# Patient Record
Sex: MALE | Race: WHITE | NOT HISPANIC OR LATINO | Employment: UNEMPLOYED | URBAN - METROPOLITAN AREA
[De-identification: names, ages, dates, MRNs, and addresses within clinical notes are randomized per-mention and may not be internally consistent; named-entity substitution may affect disease eponyms.]

---

## 2023-12-19 ENCOUNTER — APPOINTMENT (OUTPATIENT)
Dept: BEHAVIORAL HEALTH | Facility: CLINIC | Age: 23
End: 2023-12-19
Payer: COMMERCIAL

## 2024-01-04 ENCOUNTER — TELEMEDICINE (OUTPATIENT)
Dept: BEHAVIORAL HEALTH | Facility: CLINIC | Age: 24
End: 2024-01-04
Payer: COMMERCIAL

## 2024-01-04 DIAGNOSIS — F41.1 GENERALIZED ANXIETY DISORDER: Primary | ICD-10-CM

## 2024-01-04 PROCEDURE — 99213 OFFICE O/P EST LOW 20 MIN: CPT | Performed by: PSYCHIATRY & NEUROLOGY

## 2024-01-04 RX ORDER — MIRTAZAPINE 45 MG/1
45 TABLET, FILM COATED ORAL NIGHTLY
Qty: 90 TABLET | Refills: 1 | Status: SHIPPED | OUTPATIENT
Start: 2024-01-04 | End: 2024-05-13

## 2024-01-05 NOTE — PROGRESS NOTES
Virtual appointment with patient.   Consent obtained for this platform and identification verified.  He was located at home in [place].     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       He has been adherent to Duloxetine 60 mg every morning.  When Mirtazapine 30 mg refill was delayed from pharmacy he utilized remaining 45 mg tablet she had at home and found that 45 mg worked better for sleep and for anxiety.   He states the combination of Duloxetine 60 mg every morning and Mirtazapine 45 mg every evening effective anxiety regimen and well-tolerated.       He denies any sustained sadness.  He denies suicidal ideation.  No aggression or self-harm.      No flaco or hallucinations.   No substance use.       MSE:  Normal dress and grooming.   Thought process goal-directed.  Euthymic mood.  Full range of affect. Speech normal tone, rate, quality.   Future-oriented.   No suicidal or homicidal ideation.   Alert and oriented X 4.   Judgment and insight good    Dx:  Generalized Anxiety Disorder    Plan:    Continue Duloxetine 60 mg every morning.   Ongoing consent obtained.  Has sufficient supply at home.      Continue Mirtazapine 45 mg every evening.  Ongoing consent obtained.  Rx for 45 mg #90 with one refill sent to [facility]    Therapy ongoing    Follow-up in 3 months, call as needed in the interim

## 2024-04-04 DIAGNOSIS — F41.1 GENERALIZED ANXIETY DISORDER: ICD-10-CM

## 2024-04-04 RX ORDER — DULOXETIN HYDROCHLORIDE 60 MG/1
60 CAPSULE, DELAYED RELEASE ORAL DAILY
Qty: 90 CAPSULE | Refills: 1 | Status: SHIPPED | OUTPATIENT
Start: 2024-04-04

## 2024-05-11 DIAGNOSIS — F41.1 GENERALIZED ANXIETY DISORDER: ICD-10-CM

## 2024-05-13 RX ORDER — MIRTAZAPINE 45 MG/1
45 TABLET, FILM COATED ORAL NIGHTLY
Qty: 90 TABLET | Refills: 0 | Status: SHIPPED | OUTPATIENT
Start: 2024-05-13 | End: 2024-11-09

## 2024-06-28 ENCOUNTER — APPOINTMENT (OUTPATIENT)
Dept: BEHAVIORAL HEALTH | Facility: CLINIC | Age: 24
End: 2024-06-28
Payer: COMMERCIAL

## 2024-06-28 DIAGNOSIS — F41.1 GENERALIZED ANXIETY DISORDER: Primary | ICD-10-CM

## 2024-06-28 PROCEDURE — 99213 OFFICE O/P EST LOW 20 MIN: CPT | Performed by: PSYCHIATRY & NEUROLOGY

## 2024-06-28 RX ORDER — HYDROXYZINE PAMOATE 25 MG/1
CAPSULE ORAL
Qty: 90 CAPSULE | Refills: 0 | Status: SHIPPED | OUTPATIENT
Start: 2024-06-28

## 2024-06-29 NOTE — PROGRESS NOTES
Virtual appointment with patient.  Consent obtained for this platform and identification verified.   He was located at home.      He reports doing well and in good mood.      Adherent to medication regimen of Duloxetine 60 mg every morning and Mirtazapine 45 mg every evening.   Denies adverse effects and reports continuing to help with mood and anxiety.      He still has periodic heightened states of anxiety.      No flaco or hallucinations.    No substance use.      He meditates every morning to clear mind to start the day.    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      MSE:  Normal dress and grooming.  Thought process goal-directed.  Euthymic mood, full range of affect.  Speech normal tone, rate, quality.  Denies suicidal or homicidal ideation.  Alert and oriented X 4.  Judgment and insight good.      Dx:  Generalized Anxiety Disorder    Plan:    Continue Duloxetine 60 mg every morning,  Ongoing consent obtained.  Has sufficient supply.      Continue Mirtazapine 45 mg every evening.  Ongoing consent obtained.  Has sufficient supply.      Hydroxyzine 25 mg PRN heightened anxiety.  Consent obtained after review of risks and benefits.  Rx for 25 mg #90 sent to [facility]    Follow-up 12/2024, call as needed in the interim

## 2024-08-12 DIAGNOSIS — F41.1 GENERALIZED ANXIETY DISORDER: ICD-10-CM

## 2024-08-12 RX ORDER — HYDROXYZINE PAMOATE 25 MG/1
CAPSULE ORAL
Qty: 90 CAPSULE | Refills: 0 | OUTPATIENT
Start: 2024-08-12

## 2024-08-13 NOTE — TELEPHONE ENCOUNTER
Patient wanted to let you know that he has been stressed out and not feeling like himself lately. Wanted to talk about possibly changing his medication. Would like to speak with  you sooner than next scheduled appointment if possible for you to give him a call [phone]

## 2024-08-15 ENCOUNTER — CLINICAL SUPPORT (OUTPATIENT)
Dept: AUDIOLOGY | Facility: CLINIC | Age: 24
End: 2024-08-15
Payer: COMMERCIAL

## 2024-08-15 DIAGNOSIS — H93.13 TINNITUS AURIUM, BILATERAL: ICD-10-CM

## 2024-08-15 DIAGNOSIS — R42 DIZZINESS: ICD-10-CM

## 2024-08-15 DIAGNOSIS — H90.42 SENSORINEURAL HEARING LOSS (SNHL) OF LEFT EAR WITH UNRESTRICTED HEARING OF RIGHT EAR: Primary | ICD-10-CM

## 2024-08-15 PROCEDURE — 92550 TYMPANOMETRY & REFLEX THRESH: CPT

## 2024-08-15 PROCEDURE — 92557 COMPREHENSIVE HEARING TEST: CPT

## 2024-08-15 NOTE — PROGRESS NOTES
AUDIOLOGY ADULT AUDIOMETRIC EVALUATION      Name:  Saud Hu   :  2000  Age:  23 y.o.  Date of Evaluation:  8/15/2024    Time: 6491-8754    IMPRESSIONS     Normal hearing sensitivity in the right ear and moderate to mild sensorineural hearing loss from 125-1500 Hz with normal hearing sensitivity at 2000 Hz sloping to severe sensorineural hearing loss from 7084-1430 Hz in the left ear.  Word understanding in quiet is excellent in both ears.  Tympanometry indicates normal middle ear pressure and tympanic membrane mobility in both ears.     Amplification needs: Continue full-time use of left hearing aid, except when activities preclude device safety.    RECOMMENDATIONS     Continue medical follow up with primary care provider and/or Ears Nose and Throat (ENT) provider as recommended.  Return for audiologic evaluation annually to monitor hearing sensitivity and assess middle ear status or sooner should concerns arise. The audiology department can be reached at [phone] to schedule an appointment.   Strive for full-time device use during waking hours, except when activities preclude device safety.  Avoid exposure to loud sounds by moving away from the noise, turning down the volume, or wearing proper hearing protection correctly.    HISTORY     Reason for visit:  Mr. Hu is seen today for a repeat audiologic evaluation due to known hearing loss in the left ear. Previous audio was performed on 22 and revealed normal hearing sensitivity for the right ear, and moderate to mild sensorineural hearing loss 250 - 1500 Hz with normal response at 2000 Hz falling to mild sensorineural hearing loss 3000- 8000 Hz for the left ear. History obtained from patient report and chart review.     Change in Hearing: denied  Tinnitus: yes in both ears non-bothersome  Otalgia: denied  Aural Pressure/Fullness: denied  Recent Ear Infections/Illness: denied  Otorrhea: denied  Dizziness: yes, described as off balance  feeling      HEARING AID INFORMATION    Left   Date of Fitting 4/25/22   [facility]    [place]   Model Audeo P50-R   Serial Number [id number]    Warranty (Repair & L/D)  07/03/2025   Wax Protection Cerushield   /Dome 1M/small opem       EVALUATION         TEST RESULTS     Otoscopic Evaluation:  Right Ear: Ear canal clear, tympanic membrane visualized.  Left Ear: Ear canal clear, tympanic membrane visualized.    Tympanometry (226 Hz):  Right Ear: Type A, middle ear pressure and tympanic membrane compliance within normal limits.   Left Ear: Type A, middle ear pressure and tympanic membrane compliance within normal limits.     Acoustic Reflexes:   Right Ear: (Ipsilateral) Responses present at expected levels 500-4000 Hz.  Left Ear: (Ipsilateral) Responses present at 500-2000 Hz, and absent at 4000 Hz.    Test technique:  Pure Tone Audiometry via headphones  Reliability: Good    Pure Tone Audiometry:    Right Ear: Normal hearing sensitivity 125-8000 Hz  Left Ear: Moderate to mild sensorineural hearing loss from 125-1500 Hz with normal hearing sensitivity at 2000 Hz sloping to severe sensorineural hearing loss from 8347-2918 Hz    Speech Audiometry:   Right Ear:  Speech Reception Threshold (SRT) was obtained at 5 dB HL. Word Recognition scores were excellent (96%) in quiet when words were presented at 50 dB HL. These results are based on [facility] Auditory Test No.6 (NU-6) (N=25).   Left Ear:  Speech Reception Threshold (SRT) was obtained at 30 dB HL. Word Recognition scores were excellent (96%) in quiet when words were presented at 70 dB HL. These results are based on [facility] Auditory Test No.6 (NU-6) (N=25).     Comparison of today's results with previous test results: Slight progression in left ear since last evaluation on 1/14/22     Hearing aid programming:  Hearing aid connected to software and firmware update was completed. Hearing aid reprogrammed to today's  hearing test. Patient noted an echo sound. Ran audiogram direct and feedback manager. Patient reported improved sound volume and sound quality.     Hearing aid cleaned and checked. Changed dome and wax guard. Provided patient with extra domes and wax guards. Patient pleased with changes.     PATIENT EDUCATION     Discussed results and recommendations with Mr. Hu. Questions were addressed and the patient was encouraged to contact our department at [phone] should concerns arise.     JOSH Gamino.  Audiology Graduate Clinician    [facility]  Licensed Clinical Audiologist  I verify that I have reviewed the history, test results, and interpretation for this patient.    Degree of   Hearing Sensitivity dB Range   Within Normal Limits (WNL) 0 - 20   Slight 25   Mild 26 - 40   Moderate 41 - 55   Moderately-Severe 56 - 70   Severe 71 - 90   Profound 91 +     Key   CHL Conductive Hearing Loss   ECV Ear Canal Volume   HA Hearing Aid   NIHL Noise-Induced Hearing Loss   PTA Pure Tone Average   SNHL Sensorineural Hearing Loss   TM Tympanic Membrane   WNL Within Normal Limits

## 2024-09-06 ENCOUNTER — APPOINTMENT (OUTPATIENT)
Dept: BEHAVIORAL HEALTH | Facility: CLINIC | Age: 24
End: 2024-09-06
Payer: COMMERCIAL

## 2024-09-12 ENCOUNTER — APPOINTMENT (OUTPATIENT)
Dept: BEHAVIORAL HEALTH | Facility: CLINIC | Age: 24
End: 2024-09-12
Payer: COMMERCIAL

## 2024-09-12 DIAGNOSIS — F32.1 MAJOR DEPRESSIVE DISORDER, SINGLE EPISODE, MODERATE (MULTI): ICD-10-CM

## 2024-09-12 DIAGNOSIS — F41.1 GENERALIZED ANXIETY DISORDER: Primary | ICD-10-CM

## 2024-09-12 PROCEDURE — 99213 OFFICE O/P EST LOW 20 MIN: CPT | Performed by: PSYCHIATRY & NEUROLOGY

## 2024-09-12 RX ORDER — MIRTAZAPINE 45 MG/1
45 TABLET, FILM COATED ORAL NIGHTLY
Qty: 90 TABLET | Refills: 1 | Status: SHIPPED | OUTPATIENT
Start: 2024-09-12 | End: 2025-03-11

## 2024-09-12 RX ORDER — FLUOXETINE HYDROCHLORIDE 20 MG/1
20 CAPSULE ORAL DAILY
Qty: 90 CAPSULE | Refills: 1 | Status: SHIPPED | OUTPATIENT
Start: 2024-09-12 | End: 2025-03-11

## 2024-09-12 RX ORDER — HYDROXYZINE PAMOATE 25 MG/1
CAPSULE ORAL
Qty: 180 CAPSULE | Refills: 0 | Status: SHIPPED | OUTPATIENT
Start: 2024-09-12

## 2024-09-15 NOTE — PROGRESS NOTES
"Virtual appointment with patient.  Consent obtained for this platform and identification verified.   He was located at home.      He reports feeling more depressed over the last 2 months which includes \"passive\" suicidal ideation.  Because of his concern that being on Duloxetine was a factor with his suicidal thoughts he lowered the dose to 30 mg a month ago.   He indicates suicidal thoughts have continued at similar frequency ie more days than not.  He denies suicidal plan or intent.   He denies self-harm.      Also accompanying depression energy level lower, some amotivation and anhedonia as well as more difficulty getting restful night of sleep.    Appetite fair.      In addition to taking Duloxetine 60 mg every morning he has been taking Mirtazapine 45 mg every evening and Hydroxyzine 25 mg PRN for heightened anxiety.    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       He has been meeting with his  and also recently established with [facility] counselor after seeing therapist at [facility] for temporary period of time.        MSE:  Normal dress and grooming.   Thought process goal-directed.   Depressed mood, full range of affect.  Passive suicidal ideation, denies plan or intent.   Future-oriented.   No agitation.   Alert and oriented X 4.   Judgment and insight fair    Dx:  Generalized Anxiety Disorder  Major Depressive Disorder, single episode, moderate    Plan:    Discontinue Duloxetine    Begin Fluoxetine 20 mg every morning.   Consent obtained after review of risks and benefits including boxed warning and serotonin syndrome.  Rx for 20 mg #90 with one refill sent to [facility]    Continue Mirtazapine 45 mg every evening.  Ongoing consent obtained.      Continue Hydroxyzine 25 mg as needed for heightened anxiety.      Therapy ongoing    Reviewed use of ER if acute safety " matters.     Work commitments make an IOP or PHP difficult at this time    Follow-up 4-6 weeks, call as needed in the interim

## 2024-10-15 ENCOUNTER — APPOINTMENT (OUTPATIENT)
Dept: BEHAVIORAL HEALTH | Facility: CLINIC | Age: 24
End: 2024-10-15
Payer: COMMERCIAL

## 2024-10-24 ENCOUNTER — APPOINTMENT (OUTPATIENT)
Dept: BEHAVIORAL HEALTH | Facility: CLINIC | Age: 24
End: 2024-10-24
Payer: COMMERCIAL

## 2025-01-02 ENCOUNTER — APPOINTMENT (OUTPATIENT)
Dept: BEHAVIORAL HEALTH | Facility: CLINIC | Age: 25
End: 2025-01-02
Payer: COMMERCIAL

## 2025-01-02 DIAGNOSIS — F41.1 GENERALIZED ANXIETY DISORDER: Primary | ICD-10-CM

## 2025-01-02 DIAGNOSIS — F32.1 MAJOR DEPRESSIVE DISORDER, SINGLE EPISODE, MODERATE (MULTI): ICD-10-CM

## 2025-01-02 PROCEDURE — 99213 OFFICE O/P EST LOW 20 MIN: CPT | Performed by: PSYCHIATRY & NEUROLOGY

## 2025-01-03 NOTE — PROGRESS NOTES
"Virtual appointment with patient.  Consent obtained for this platform and identification verified.  He was located at home.      He states change from Duloxetine to Fluoxetine 20 mg at last appointment 9/2024 has helped with mood.  Patient reports \"much more joyful, not so hard on myself\".    He states last time experienced suicidal ideation was \"a while ago\".      No flaco or hallucinations  No substance use    Involved in Judaism including close relationship with  and he also presents to churchgoers.      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     He has also been adherent to Mirtazapine 45 mg every evening which helps with anxiety/sleep.  He also takes Hydroxyzine 25 mg every morning which helps with reducing anxiety to start the day.      Other than some appetite increase with Mirtazapine he denies adverse effects to medication regimen.      MSE:  Normal dress and grooming.  Speech normal tone, rate, quality.  Thought process goal-directed.  Euthymic mood, full range of affect.   Denies suicidal or homicidal ideation.   Alert and oriented X 4.   Judgment and insight good    Dx:  Generalized Anxiety Disorder  Major Depressive Disorder, single episode, moderate    Plan:    Continue current medication regimen of Fluoxetine 20 mg every morning, Mirtazapine 45 mg every evening, and Hydroxyzine 25 mg every morning.    Ongoing consent obtained  Has sufficient supplies    Therapy ongoing     Follow-up 3-4 months, call as needed in the interim  "

## 2025-01-11 DIAGNOSIS — F41.1 GENERALIZED ANXIETY DISORDER: ICD-10-CM

## 2025-01-12 RX ORDER — HYDROXYZINE PAMOATE 25 MG/1
CAPSULE ORAL
Qty: 180 CAPSULE | Refills: 0 | OUTPATIENT
Start: 2025-01-12

## 2025-01-16 DIAGNOSIS — F41.1 GENERALIZED ANXIETY DISORDER: ICD-10-CM

## 2025-01-16 RX ORDER — MIRTAZAPINE 45 MG/1
45 TABLET, FILM COATED ORAL NIGHTLY
Qty: 90 TABLET | Refills: 1 | Status: SHIPPED | OUTPATIENT
Start: 2025-01-16 | End: 2025-07-15

## 2025-03-07 DIAGNOSIS — F41.1 GENERALIZED ANXIETY DISORDER: ICD-10-CM

## 2025-03-07 RX ORDER — FLUOXETINE HYDROCHLORIDE 20 MG/1
20 CAPSULE ORAL DAILY
Qty: 90 CAPSULE | Refills: 1 | Status: SHIPPED | OUTPATIENT
Start: 2025-03-07

## 2025-07-28 ENCOUNTER — APPOINTMENT (OUTPATIENT)
Dept: BEHAVIORAL HEALTH | Facility: CLINIC | Age: 25
End: 2025-07-28
Payer: COMMERCIAL

## 2025-07-28 DIAGNOSIS — F41.1 GENERALIZED ANXIETY DISORDER: Primary | ICD-10-CM

## 2025-07-28 PROCEDURE — 99214 OFFICE O/P EST MOD 30 MIN: CPT | Performed by: PSYCHIATRY & NEUROLOGY

## 2025-07-28 RX ORDER — MIRTAZAPINE 30 MG/1
30 TABLET, FILM COATED ORAL NIGHTLY
Qty: 90 TABLET | Refills: 1 | Status: SHIPPED | OUTPATIENT
Start: 2025-07-28 | End: 2026-01-24

## 2025-07-28 RX ORDER — HYDROXYZINE PAMOATE 25 MG/1
CAPSULE ORAL
Qty: 60 CAPSULE | Refills: 0 | Status: SHIPPED | OUTPATIENT
Start: 2025-07-28

## 2025-08-19 DIAGNOSIS — F41.1 GENERALIZED ANXIETY DISORDER: ICD-10-CM

## 2025-08-19 RX ORDER — HYDROXYZINE PAMOATE 25 MG/1
CAPSULE ORAL
Qty: 180 CAPSULE | Refills: 0 | Status: SHIPPED | OUTPATIENT
Start: 2025-08-19

## 2025-09-16 ENCOUNTER — APPOINTMENT (OUTPATIENT)
Dept: PRIMARY CARE | Facility: CLINIC | Age: 25
End: 2025-09-16
Payer: COMMERCIAL

## 2025-09-23 ENCOUNTER — APPOINTMENT (OUTPATIENT)
Dept: BEHAVIORAL HEALTH | Facility: CLINIC | Age: 25
End: 2025-09-23
Payer: COMMERCIAL